# Patient Record
Sex: FEMALE | Race: WHITE | Employment: FULL TIME | URBAN - METROPOLITAN AREA
[De-identification: names, ages, dates, MRNs, and addresses within clinical notes are randomized per-mention and may not be internally consistent; named-entity substitution may affect disease eponyms.]

---

## 2020-05-26 ENCOUNTER — TELEPHONE (OUTPATIENT)
Dept: OTHER | Facility: CLINIC | Age: 25
End: 2020-05-26

## 2020-05-26 NOTE — TELEPHONE ENCOUNTER
Pt called, left vm requesting consult for vein on right calf.     I called pt back, verified  and reason for consult.     Pt needs new consult for [facility]. Routing to  RN for coordinating.     COBY DsouzaN, RN  [facility]

## 2020-05-26 NOTE — TELEPHONE ENCOUNTER
Pt can be reached at [phone].     Peggy Valle, COBYN, RN  [facility] [place]

## 2020-05-27 NOTE — TELEPHONE ENCOUNTER
Called pt and LDVM regarding scheduling a virtual visit at this time with our vein clinics still being closed d/t COVID-19. Dr. Jewell is available anything this week or next week.    Gave pt our main phone number to call back ([phone]) if interested in scheduling a video visit. Otherwise, informed pt we will add her to our reschedule list if we do not hear back from her. Then we will contact her when we are back open and scheduling.    COBY LariosN, RN  [facility]

## 2020-05-28 NOTE — TELEPHONE ENCOUNTER
Pt called back today and has been put on Dr. Jewell's schedule on Friday 5/29 for a virtual visit.     No further action needed at this time.    COBY LariosN, RN  [facility]  [facility]

## 2020-05-29 ENCOUNTER — VIRTUAL VISIT (OUTPATIENT)
Dept: VASCULAR SURGERY | Facility: CLINIC | Age: 25
End: 2020-05-29

## 2020-05-29 DIAGNOSIS — I83.811 VARICOSE VEINS OF RIGHT LOWER EXTREMITY WITH PAIN: Primary | ICD-10-CM

## 2020-05-29 DIAGNOSIS — I83.891 VARICOSE VEINS OF RIGHT LOWER EXTREMITY WITH EDEMA: ICD-10-CM

## 2020-05-29 PROCEDURE — 99207 ZZC VEINSOLUTIONS FREE SCREENING: CPT | Performed by: SURGERY

## 2020-05-29 RX ORDER — DEXTROAMPHETAMINE SACCHARATE, AMPHETAMINE ASPARTATE MONOHYDRATE, DEXTROAMPHETAMINE SULFATE AND AMPHETAMINE SULFATE 5; 5; 5; 5 MG/1; MG/1; MG/1; MG/1
20 CAPSULE, EXTENDED RELEASE ORAL DAILY
COMMUNITY
Start: 2020-03-20

## 2020-05-29 NOTE — LETTER
"    5/29/2020         RE: Telma Lieberman  [address] [facility] [place] [address]        Dear Colleague,    Thank you for referring your patient, Telma Lieberman, [facility] Please see a copy of my visit note below.    Telma Lieberman is a 24 year old female who is being evaluated via a billable video visit.      The patient has been notified of following:     \"This video visit will be conducted via a call between you and your physician/provider. We have found that certain health care needs can be provided without the need for an in-person physical exam.  This service lets us provide the care you need with a video conversation.  If a prescription is necessary we can send it directly to your pharmacy.  If lab work is needed we can place an order for that and you can then stop by our lab to have the test done at a later time.    Video visits are billed at different rates depending on your insurance coverage.  Please reach out to your insurance provider with any questions.    If during the course of the call the physician/provider feels a video visit is not appropriate, you will not be charged for this service.\"    Patient has given verbal consent for Video visit? Yes    How would you like to obtain your AVS? Mail a copy    Patient would like the video invitation sent by: Text to cell phone: [phone]    Will anyone else be joining your video visit? No    Video-Visit Details    Type of service:  Video Visit    Video Start Time: 10:22 AM  Video End Time: 10:55 AM    Originating Location (pt. Location): Home    Distant Location (provider location): [facility]     Platform used for Video Visit: [facility] free screening  Telma Lieberman is a 24-year-old female who presents with a several year history of veins of her right lower extremity but presents at this time because of the development of a new varicosity on the distal medial right leg.  " "She describes pain as an aching, tiredness and heaviness and admits to swelling of the right ankle frequently.  She also notes that her right calf is larger than her left chronically.  She has worn compression hose for 3 months and notes some improvement in the swelling but does not have significant pain relief with them.    She has a history of deep vein thrombosis, superficial thrombophlebitis or hemorrhage.  She has a heterozygous factor V Leiden mutation.    She does feel that the symptoms interfere with actives of daily living, making it difficult for her to be on her feet for long periods of time due to the aching.    Past medical history is significant for asthma.    Family history significant for varicose veins in her mother and in her maternal grandmother.  Both her mother and maternal grandmother have factor V Leiden mutation.  He states that her grandmother had a \"clot\" when she was pregnant.  She also had a blood clot near the time of her death.  Her mother has not had venous thromboembolism.    Physical exam  General: Pleasant female in no acute distress  HEENT: Normocephalic, atraumatic.  EOMI.  External ears and nose are normal.  Respiratory: Normal respiratory effort  Psychiatric: Judgment, insight, mood and affect are normal  Extremities: Right lower extremity: She has a 6 mm vein coursing down the anteromedial right leg along the course of the great saphenous vein.  This likely represents a dilated, immediate subcutaneous great saphenous vein.    There is a 4 to 5 mm varicosity distal third of the posterior leg coursing and difficulty with saphenous vein.  There are scattered telangiectasias about her right medial ankle with a 3 mm varicosity coursing on the medial ankle as well.    The right calf is visibly larger than the left.  There is trace edema of her right ankle but no venous stasis changes.    Impression  Venous clinical severity score 9, CEAP 3 bilateral severity chronic venous " insufficiency recalcitrant to conservative measures.  We discussed options of continued conservative management with compression, elevation, dietary measures and exercise.  She understands that this is a slowly progressive process.    Risks of conservative measures including superficial thrombophlebitis, bleeding and progression of the venous insufficiency were discussed.    She wishes to pursue a right lower extremity venous competency study to better diagnose the problem and to plan future treatment.    Plan  Right lower extremity venous competency study when we resume scheduling office patients.    MD KENNA Najera MD    Again, thank you for allowing me to participate in the care of your patient.        Sincerely,        Sloan Jewell MD

## 2020-05-29 NOTE — PROGRESS NOTES
"Telma Lieberman is a 24 year old female who is being evaluated via a billable video visit.      The patient has been notified of following:     \"This video visit will be conducted via a call between you and your physician/provider. We have found that certain health care needs can be provided without the need for an in-person physical exam.  This service lets us provide the care you need with a video conversation.  If a prescription is necessary we can send it directly to your pharmacy.  If lab work is needed we can place an order for that and you can then stop by our lab to have the test done at a later time.    Video visits are billed at different rates depending on your insurance coverage.  Please reach out to your insurance provider with any questions.    If during the course of the call the physician/provider feels a video visit is not appropriate, you will not be charged for this service.\"    Patient has given verbal consent for Video visit? Yes    How would you like to obtain your AVS? Mail a copy    Patient would like the video invitation sent by: Text to cell phone: [phone]    Will anyone else be joining your video visit? No    Video-Visit Details    Type of service:  Video Visit    Video Start Time: 10:22 AM  Video End Time: 10:55 AM    Originating Location (pt. Location): Home    Distant Location (provider location): [facility]     Platform used for Video Visit: [facility] free screening  Telma Lieberman is a 24-year-old female who presents with a several year history of veins of her right lower extremity but presents at this time because of the development of a new varicosity on the distal medial right leg.  She describes pain as an aching, tiredness and heaviness and admits to swelling of the right ankle frequently.  She also notes that her right calf is larger than her left chronically.  She has worn compression hose for 3 months and notes some improvement in " "the swelling but does not have significant pain relief with them.    She has a history of deep vein thrombosis, superficial thrombophlebitis or hemorrhage.  She has a heterozygous factor V Leiden mutation.    She does feel that the symptoms interfere with actives of daily living, making it difficult for her to be on her feet for long periods of time due to the aching.    Past medical history is significant for asthma.    Family history significant for varicose veins in her mother and in her maternal grandmother.  Both her mother and maternal grandmother have factor V Leiden mutation.  He states that her grandmother had a \"clot\" when she was pregnant.  She also had a blood clot near the time of her death.  Her mother has not had venous thromboembolism.    Physical exam  General: Pleasant female in no acute distress  HEENT: Normocephalic, atraumatic.  EOMI.  External ears and nose are normal.  Respiratory: Normal respiratory effort  Psychiatric: Judgment, insight, mood and affect are normal  Extremities: Right lower extremity: She has a 6 mm vein coursing down the anteromedial right leg along the course of the great saphenous vein.  This likely represents a dilated, immediate subcutaneous great saphenous vein.    There is a 4 to 5 mm varicosity distal third of the posterior leg coursing and difficulty with saphenous vein.  There are scattered telangiectasias about her right medial ankle with a 3 mm varicosity coursing on the medial ankle as well.    The right calf is visibly larger than the left.  There is trace edema of her right ankle but no venous stasis changes.    Impression  Venous clinical severity score 9, CEAP 3 bilateral severity chronic venous insufficiency recalcitrant to conservative measures.  We discussed options of continued conservative management with compression, elevation, dietary measures and exercise.  She understands that this is a slowly progressive process.    Risks of conservative measures " including superficial thrombophlebitis, bleeding and progression of the venous insufficiency were discussed.    She wishes to pursue a right lower extremity venous competency study to better diagnose the problem and to plan future treatment.    Plan  Right lower extremity venous competency study when we resume scheduling office patients.    MD KENNA Najera MD

## 2020-05-29 NOTE — Clinical Note
Schedule patient for right lower extremity venous competency study when we resume scheduling ultrasounds

## 2020-06-22 ENCOUNTER — ANCILLARY PROCEDURE (OUTPATIENT)
Dept: ULTRASOUND IMAGING | Facility: CLINIC | Age: 25
End: 2020-06-22
Attending: SURGERY
Payer: COMMERCIAL

## 2020-06-22 ENCOUNTER — OFFICE VISIT (OUTPATIENT)
Dept: VASCULAR SURGERY | Facility: CLINIC | Age: 25
End: 2020-06-22
Attending: SURGERY
Payer: COMMERCIAL

## 2020-06-22 DIAGNOSIS — I83.811 VARICOSE VEINS OF RIGHT LOWER EXTREMITY WITH PAIN: Primary | ICD-10-CM

## 2020-06-22 DIAGNOSIS — I83.811 VARICOSE VEINS OF RIGHT LOWER EXTREMITY WITH PAIN: ICD-10-CM

## 2020-06-22 DIAGNOSIS — I83.891 VARICOSE VEINS OF LEG WITH SWELLING, RIGHT: ICD-10-CM

## 2020-06-22 DIAGNOSIS — I83.891 VARICOSE VEINS OF RIGHT LOWER EXTREMITY WITH EDEMA: ICD-10-CM

## 2020-06-22 PROCEDURE — 93971 EXTREMITY STUDY: CPT | Mod: RT | Performed by: SURGERY

## 2020-06-22 PROCEDURE — 99213 OFFICE O/P EST LOW 20 MIN: CPT | Performed by: SURGERY

## 2020-06-22 NOTE — Clinical Note
Estimates for cyanoacrylate glue ablation (VenaSeal) of the right great saphenous vein.  If approval cannot be obtained then radiofrequency ablation will be performed.  She may need to sessions of ultrasound-guided, medically necessary sclerotherapy at a later date if her tributaries do not resolve.

## 2020-06-22 NOTE — LETTER
6/22/2020         RE: Telma Lieberman  [address] [place] [address]        Dear Colleague,    Thank you for referring your patient, Telma Lieberman, [facility] Please see a copy of my visit note below.    [facility]    Telma Lieberman presents in follow-up of right lower extremity pain, swelling and varicose veins.  Please see my note from 5/29/2020 for details.  She returns today for right lower extremity venous duplex ultrasound.    She admits to having her toes greater than her fingers get dusky and cold weather.  She states that the the fourth and fifth fingers of her hands have sometimes turned white when exposed to the cold.  She admitted that swimming in a cold leg recently caused her toes to become quite dusky.    Her past medical history is significant for heterozygous factor V Leiden mutation without thrombotic complications.  In my previous dictation I erroneously stated that she had had thromboembolic complications when she has not.  No history of superficial thrombophlebitis or deep vein thrombosis.  Her mother also has heterozygous factor V Leiden mutation and has had no complications.    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  Both her mother and her maternal grandmother had varicose veins.  Her grandmother had superficial thrombophlebitis with a pregnancy.    Physical Exam  General: Pleasant female in no acute distress.  Blood pressure 114/71, pulse 85 and regular  Extremities: Right lower extremity: 6 mm vein coursing down the anteromedial right leg with slightly dilated areas in the proximal third of the leg and another area of the distal right anteromedial leg.  There is a varicosity coursing posterior medially and distally measuring 4 to 5 mm.  The right great saphenous  vein is enlarged extending onto the dorsum of the right foot.  There is 1+ edema of the right ankle.  There is some duskiness of the toes the right foot.  She has several small eschars from insect bites from being outside.    Left lower extremity: No significant varicose veins are appreciated.  She has no significant edema of the left ankle.  There is some duskiness of the toes of her left foot.    She has 3+ radial pulses bilaterally.  Trace duskiness noted in the tips of her fingers.    She has 3+ dorsalis pedis and posterior tibial pulses bilaterally.    Ultrasound:  Right lower extremity: No evidence of deep vein thrombosis.  Her right common femoral through mid femoral veins are incompetent.  The distal femoral and popliteal veins are competent.    Her right great saphenous vein is incompetent at the saphenofemoral junction to the ankle, measures 10.7 mm in diameter with reflux time of 3.9 seconds.  It is a source of a 4.1 mm diameter incompetent vein branch coursing down the medial calf from the calf great saphenous vein with reflux time of 3 seconds.    The right small saphenous vein, vein of Giacomini and anterior chest were saphenous veins are competent.    There is a 3.6 mm diameter incompetent  13 cm below the knee crease communicating with the great saphenous vein.  There is a 3 mm diameter incompetent  5 cm above the medial malleolus on the medial leg.    Assessment:  CEAP 3 right lower extremity chronic venous insufficiency secondary to right great saphenous vein incompetence.  She has worn compression hose for more than 3 months without improvement in her symptoms.  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  The aching and the swelling have not been controlled with compression and exercise.    If she chose  treatment, should be candidate for endovenous ablation of the right great saphenous vein.  Both thermal and nonthermal techniques were discussed.  Details of radiofrequency ablation including risks of bleeding, infection, nerve injury, scarring, hyperpigmentation, deep vein thrombosis, recanalization of the great saphenous vein and recurrent varicose veins were discussed.    Details of cyanoacrylate glue ablation of the great saphenous vein including risks of deep vein thrombosis, superficial thrombophlebitis, glue hypersensitivity and recanalization of the great saphenous vein were discussed.  She understands that an additional procedure may be required to completely eliminate the great saphenous vein tributaries.  This likely could be accomplished with ultrasound-guided, medically necessary sclerotherapy at a later date.    She understands the option of continued conservative measures with risks of superficial thrombophlebitis, bleeding and progression of the disease process.  She voiced understanding and her questions were answered.    Plan:  We will submit for insurance approval for cyanoacrylate glue ablation of the great saphenous vein.  If this will not be covered, we will perform radiofrequency ablation.  She will be traveling back to the [place] and [place] and would hope to have this procedure taking care of at that time.    Sloan Jewell MD    Dictation completed using dragon voice recognition software            Again, thank you for allowing me to participate in the care of your patient.        Sincerely,        Sloan Jewell MD

## 2020-06-22 NOTE — PROGRESS NOTES
[facility] Note    Telma Lieberman presents in follow-up of right lower extremity pain, swelling and varicose veins.  Please see my note from 5/29/2020 for details.  She returns today for right lower extremity venous duplex ultrasound.    She admits to having her toes greater than her fingers get dusky and cold weather.  She states that the the fourth and fifth fingers of her hands have sometimes turned white when exposed to the cold.  She admitted that swimming in a cold leg recently caused her toes to become quite dusky.    Her past medical history is significant for heterozygous factor V Leiden mutation without thrombotic complications.  In my previous dictation I erroneously stated that she had had thromboembolic complications when she has not.  No history of superficial thrombophlebitis or deep vein thrombosis.  Her mother also has heterozygous factor V Leiden mutation and has had no complications.    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  Both her mother and her maternal grandmother had varicose veins.  Her grandmother had superficial thrombophlebitis with a pregnancy.    Physical Exam  General: Pleasant female in no acute distress.  Blood pressure 114/71, pulse 85 and regular  Extremities: Right lower extremity: 6 mm vein coursing down the anteromedial right leg with slightly dilated areas in the proximal third of the leg and another area of the distal right anteromedial leg.  There is a varicosity coursing posterior medially and distally measuring 4 to 5 mm.  The right great saphenous vein is enlarged extending onto the dorsum of the right foot.  There is 1+ edema of the right ankle.  There is some duskiness of the toes the right foot.  She has several small eschars from insect bites from being outside.    Left lower extremity: No  significant varicose veins are appreciated.  She has no significant edema of the left ankle.  There is some duskiness of the toes of her left foot.    She has 3+ radial pulses bilaterally.  Trace duskiness noted in the tips of her fingers.    She has 3+ dorsalis pedis and posterior tibial pulses bilaterally.    Ultrasound:  Right lower extremity: No evidence of deep vein thrombosis.  Her right common femoral through mid femoral veins are incompetent.  The distal femoral and popliteal veins are competent.    Her right great saphenous vein is incompetent at the saphenofemoral junction to the ankle, measures 10.7 mm in diameter with reflux time of 3.9 seconds.  It is a source of a 4.1 mm diameter incompetent vein branch coursing down the medial calf from the calf great saphenous vein with reflux time of 3 seconds.    The right small saphenous vein, vein of Giacomini and anterior chest were saphenous veins are competent.    There is a 3.6 mm diameter incompetent  13 cm below the knee crease communicating with the great saphenous vein.  There is a 3 mm diameter incompetent  5 cm above the medial malleolus on the medial leg.    Assessment:  CEAP 3 right lower extremity chronic venous insufficiency secondary to right great saphenous vein incompetence.  She has worn compression hose for more than 3 months without improvement in her symptoms.  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  The aching and the swelling have not been controlled with compression and exercise.    If she chose treatment, should be candidate for endovenous ablation of the right great saphenous vein.  Both thermal and nonthermal techniques were discussed.  Details of radiofrequency ablation including risks of bleeding, infection, nerve injury, scarring,  hyperpigmentation, deep vein thrombosis, recanalization of the great saphenous vein and recurrent varicose veins were discussed.    Details of cyanoacrylate glue ablation of the great saphenous vein including risks of deep vein thrombosis, superficial thrombophlebitis, glue hypersensitivity and recanalization of the great saphenous vein were discussed.  She understands that an additional procedure may be required to completely eliminate the great saphenous vein tributaries.  This likely could be accomplished with ultrasound-guided, medically necessary sclerotherapy at a later date.    She understands the option of continued conservative measures with risks of superficial thrombophlebitis, bleeding and progression of the disease process.  She voiced understanding and her questions were answered.    Plan:  We will submit for insurance approval for cyanoacrylate glue ablation of the great saphenous vein.  If this will not be covered, we will perform radiofrequency ablation.  She will be traveling back to the [place] and [place] and would hope to have this procedure taking care of at that time.    Sloan Jewell MD    Dictation completed using dragon voice recognition software

## 2021-01-15 ENCOUNTER — HEALTH MAINTENANCE LETTER (OUTPATIENT)
Age: 26
End: 2021-01-15

## 2021-06-08 ENCOUNTER — TELEPHONE (OUTPATIENT)
Dept: VASCULAR SURGERY | Facility: CLINIC | Age: 26
End: 2021-06-08

## 2021-06-08 DIAGNOSIS — I83.891 VARICOSE VEINS OF LEG WITH SWELLING, RIGHT: ICD-10-CM

## 2021-06-08 DIAGNOSIS — I83.811 VARICOSE VEINS OF RIGHT LOWER EXTREMITY WITH PAIN: Primary | ICD-10-CM

## 2021-06-08 NOTE — TELEPHONE ENCOUNTER
Telma has moved to [place] and saw Dr. Jewell in 2020.  Patient would like to schedule procedure now, but authorization has  and patient would need recent ultrasound to submit for new PA.  Patient wants new ultrasound done in [place].  Explained that Dr. Jewell does not have privileges to order ultrasounds in [place].  Left patient my direct line to call back and discuss further if she decides to return to [place] to have imaging done.

## 2021-09-04 ENCOUNTER — HEALTH MAINTENANCE LETTER (OUTPATIENT)
Age: 26
End: 2021-09-04

## 2021-09-15 ENCOUNTER — ANCILLARY PROCEDURE (OUTPATIENT)
Dept: ULTRASOUND IMAGING | Facility: CLINIC | Age: 26
End: 2021-09-15
Attending: SURGERY
Payer: COMMERCIAL

## 2021-09-15 ENCOUNTER — OFFICE VISIT (OUTPATIENT)
Dept: VASCULAR SURGERY | Facility: CLINIC | Age: 26
End: 2021-09-15
Payer: COMMERCIAL

## 2021-09-15 DIAGNOSIS — I83.891 VARICOSE VEINS OF LEG WITH SWELLING, RIGHT: ICD-10-CM

## 2021-09-15 DIAGNOSIS — I83.811 VARICOSE VEINS OF RIGHT LOWER EXTREMITY WITH PAIN: Primary | ICD-10-CM

## 2021-09-15 DIAGNOSIS — I83.811 VARICOSE VEINS OF RIGHT LOWER EXTREMITY WITH PAIN: ICD-10-CM

## 2021-09-15 PROCEDURE — 99203 OFFICE O/P NEW LOW 30 MIN: CPT | Performed by: SURGERY

## 2021-09-15 PROCEDURE — 93971 EXTREMITY STUDY: CPT | Mod: RT | Performed by: SURGERY

## 2021-09-15 NOTE — PROGRESS NOTES
[facility] CONSULTATION    HPI:    Telma Lieberman is a pleasant self-referred 26 year old female whom I have seen in the past regarding right greater than left lower extremity varicose veins with pain and swelling.  I last saw her in 2018 at which time we performed an ultrasound that revealed a dilated, incompetent right great saphenous vein with tributaries coursing from it below the knee.  She describes aching, tiredness, heaviness and swelling of her leg on a regular basis despite wearing compression hose on a daily basis.  She exercises to control her weight and elevate her leg when she can but has not been able to control the symptoms adequately.  She has worn compression for 1-1/2 years.    In addition to the pain she describes as aching, tiredness and heaviness and cramping, she gets excessive fatigue of right leg.  The pain is worse after she has been on her feet for long peers of time in a warm weather and improves with elevation of leg and wearing compression.  The patient definitely feels that the symptoms interfere with actives of daily living because it makes it difficult for her to be on her feet for extended periods of time given the amount of standing she has to do.    She has no history of deep vein thrombosis or superficial thrombophlebitis but does have known factor V Leiden mutation.  There are some family members who have had venous thromboembolism.    Family history is significant for varicose veins in her father and telangiectasias and her mother.    PAST MEDICAL HISTORY: No past medical history on file.    PAST SURGICAL HISTORY: No past surgical history on file.    FAMILY HISTORY: No family history on file.    SOCIAL HISTORY:   Social History     Tobacco Use     Smoking status: Not on file   Substance Use Topics     Alcohol use: Not on file       REVIEW OF SYSTEMS: Review Of Systems  Skin: negative  Eyes: negative  Ears/Nose/Throat: negative  Respiratory: No shortness of breath, dyspnea  on exertion, cough, or hemoptysis  Cardiovascular: negative  Gastrointestinal: negative  Genitourinary: negative  Musculoskeletal: Leg pain, leg swelling  Neurologic: negative  Psychiatric: negative  Hematologic/Lymphatic/Immunologic: negative  Endocrine: negative      Vital signs:  There were no vitals taken for this visit.    Current Outpatient Medications   Medication Sig Dispense Refill     amphetamine-dextroamphetamine (ADDERALL XR) 20 MG 24 hr capsule Take 20 mg by mouth daily         PHYSICAL EXAM:  General: Pleasant, NAD.   HEENT: Normocephalic, atraumatic, external ears and nose normal.   Respiratory: Normal respiratory effort.   Cardiovascular: Pulse is regular.   Musculoskeletal: Gait and station normal.  The joints of her fingers and toes without deformity.  There is no cyanosis of her nailbeds.   EXTREMITIES: Right lower extremity: 5 mm varicosities segmentally over the right proximal medial calf and over the distal third of the medial calf along the course of the great saphenous vein.  There are 4 to 5 mm varicosities coursing distally and posterior all medially from the great saphenous vein, medial calf.  Some duskiness of her toes is noted with some coolness consistent with Raynaud's symptoms.  No wounds.  Trace to 1+ edema of the right ankle and foot.  She has a knee-high compression stocking on the left leg.  No edema is noted.   PULSES: R/L (3=normal pulse, 0=no palpable pulse) dorsalis pedis: 3/3; posterior tibial: 3/3.      Neurologic: Grossly normal  Psychiatric: Mood, affect, judgment and insight are normal     Venous Duplex Ultrasound:   Right lower extremity: Common femoral through distal femoral veins are incompetent.  Her popliteal vein is competent.  The great saphenous vein is incompetent from the saphenofemoral junction to the ankle, measures 11 mm in diameter distally with reflux time of 3.3 seconds.  Is the source of multiple incompetent vein branches measuring up to 4.8 mm in  diameter with reflux time of 1.4 seconds.  Small saphenous vein, Vein of Giacomini are competent.  There are multiple incompetent perforators on the medial leg, the largest measuring 4.8 mm in diameter 10 cm below the knee crease communicating with the great saphenous vein and an incompetent vein branch.  A 3.6 mm diameter incompetent  communicates with the great saphenous vein at the mid thigh.    The left common femoral vein demonstrates normal phasicity, compression and augmentation with no evidence of deep vein thrombosis.  The left common femoral vein is incompetent.    ASSESSMENT:  Once again discussed the anatomy and pathophysiology of venous insufficiency.  She has pursued conservative measures for more than a year and a half only to have better saphenous vein enlarged and become increasingly symptomatic.  We did discuss the option of continued conservative measures with risk of superficial thrombophlebitis, bleeding and progression of disease process.    She is a candidate for endovenous ablation of the right great saphenous vein with either thermal and nonthermal techniques.  A nonthermal technique would be better for her given the fact that her right saphenous vein will need to be ablated small and down in her leg has large varicosities coursing from the great saphenous vein in the distal third of the leg.  A better job of the pressurizing the great saphenous vein tributaries and avoiding saphenous nerve injury.  Additionally, the patient works in [place] and will fly to [place] to have the treatment but will need to return as quickly as possible.  A nonthermal technique will allow her travel more quickly with low risk of venous thromboembolism.    Risks of cyanoacrylate glue ablation including deep vein thrombosis and glue hypersensitivity were received discussed.  She knows glue hypersensitivity may have to be treated with steroids if it occurs.    PLAN:  Endovenous ablation right  great saphenous vein, preferably vein nonthermal techniques such as cyanoacrylate glue ablation to allow treatment of the entire length of the great saphenous vein and will prompt travel back to her place of employment in [facility].     Sloan Jewell MD    Dictated using Dragon voice recognition software which may result in transcription errors          [facility] NEW PATIENT:

## 2021-09-15 NOTE — LETTER
9/15/2021         RE: Telma Lieberman  [address] [place] [address]        Dear Colleague,    Thank you for referring your patient, Telma Lieberman, to the [facility] [place]. Please see a copy of my visit note below.    [facility] CONSULTATION    HPI:    Telma Lieberman is a pleasant self-referred 26 year old female whom I have seen in the past regarding right greater than left lower extremity varicose veins with pain and swelling.  I last saw her in 2018 at which time we performed an ultrasound that revealed a dilated, incompetent right great saphenous vein with tributaries coursing from it below the knee.  She describes aching, tiredness, heaviness and swelling of her leg on a regular basis despite wearing compression hose on a daily basis.  She exercises to control her weight and elevate her leg when she can but has not been able to control the symptoms adequately.  She has worn compression for 1-1/2 years.    In addition to the pain she describes as aching, tiredness and heaviness and cramping, she gets excessive fatigue of right leg.  The pain is worse after she has been on her feet for long peers of time in a warm weather and improves with elevation of leg and wearing compression.  The patient definitely feels that the symptoms interfere with actives of daily living because it makes it difficult for her to be on her feet for extended periods of time given the amount of standing she has to do.    She has no history of deep vein thrombosis or superficial thrombophlebitis but does have known factor V Leiden mutation.  There are some family members who have had venous thromboembolism.    Family history is significant for varicose veins in her father and telangiectasias and her mother.    PAST MEDICAL HISTORY: No past medical history on file.    PAST SURGICAL HISTORY: No past surgical history on file.    FAMILY HISTORY: No family history on file.    SOCIAL HISTORY:   Social  History     Tobacco Use     Smoking status: Not on file   Substance Use Topics     Alcohol use: Not on file       REVIEW OF SYSTEMS: Review Of Systems  Skin: negative  Eyes: negative  Ears/Nose/Throat: negative  Respiratory: No shortness of breath, dyspnea on exertion, cough, or hemoptysis  Cardiovascular: negative  Gastrointestinal: negative  Genitourinary: negative  Musculoskeletal: Leg pain, leg swelling  Neurologic: negative  Psychiatric: negative  Hematologic/Lymphatic/Immunologic: negative  Endocrine: negative      Vital signs:  There were no vitals taken for this visit.    Current Outpatient Medications   Medication Sig Dispense Refill     amphetamine-dextroamphetamine (ADDERALL XR) 20 MG 24 hr capsule Take 20 mg by mouth daily         PHYSICAL EXAM:  General: Pleasant, NAD.   HEENT: Normocephalic, atraumatic, external ears and nose normal.   Respiratory: Normal respiratory effort.   Cardiovascular: Pulse is regular.   Musculoskeletal: Gait and station normal.  The joints of her fingers and toes without deformity.  There is no cyanosis of her nailbeds.   EXTREMITIES: Right lower extremity: 5 mm varicosities segmentally over the right proximal medial calf and over the distal third of the medial calf along the course of the great saphenous vein.  There are 4 to 5 mm varicosities coursing distally and posterior all medially from the great saphenous vein, medial calf.  Some duskiness of her toes is noted with some coolness consistent with Raynaud's symptoms.  No wounds.  Trace to 1+ edema of the right ankle and foot.  She has a knee-high compression stocking on the left leg.  No edema is noted.   PULSES: R/L (3=normal pulse, 0=no palpable pulse) dorsalis pedis: 3/3; posterior tibial: 3/3.      Neurologic: Grossly normal  Psychiatric: Mood, affect, judgment and insight are normal     Venous Duplex Ultrasound:   Right lower extremity: Common femoral through distal femoral veins are incompetent.  Her popliteal vein  is competent.  The great saphenous vein is incompetent from the saphenofemoral junction to the ankle, measures 11 mm in diameter distally with reflux time of 3.3 seconds.  Is the source of multiple incompetent vein branches measuring up to 4.8 mm in diameter with reflux time of 1.4 seconds.  Small saphenous vein, Vein of Giacomini are competent.  There are multiple incompetent perforators on the medial leg, the largest measuring 4.8 mm in diameter 10 cm below the knee crease communicating with the great saphenous vein and an incompetent vein branch.  A 3.6 mm diameter incompetent  communicates with the great saphenous vein at the mid thigh.    The left common femoral vein demonstrates normal phasicity, compression and augmentation with no evidence of deep vein thrombosis.  The left common femoral vein is incompetent.    ASSESSMENT:  Once again discussed the anatomy and pathophysiology of venous insufficiency.  She has pursued conservative measures for more than a year and a half only to have better saphenous vein enlarged and become increasingly symptomatic.  We did discuss the option of continued conservative measures with risk of superficial thrombophlebitis, bleeding and progression of disease process.    She is a candidate for endovenous ablation of the right great saphenous vein with either thermal and nonthermal techniques.  A nonthermal technique would be better for her given the fact that her right saphenous vein will need to be ablated small and down in her leg has large varicosities coursing from the great saphenous vein in the distal third of the leg.  A better job of the pressurizing the great saphenous vein tributaries and avoiding saphenous nerve injury.  Additionally, the patient works in [place] and will fly to [place] to have the treatment but will need to return as quickly as possible.  A nonthermal technique will allow her travel more quickly with low risk of venous  thromboembolism.    Risks of cyanoacrylate glue ablation including deep vein thrombosis and glue hypersensitivity were received discussed.  She knows glue hypersensitivity may have to be treated with steroids if it occurs.    PLAN:  Endovenous ablation right great saphenous vein, preferably vein nonthermal techniques such as cyanoacrylate glue ablation to allow treatment of the entire length of the great saphenous vein and will prompt travel back to her place of employment in [facility].     Sloan Jewell MD    Dictated using Dragon voice recognition software which may result in transcription errors          [facility] NEW PATIENT:                  Again, thank you for allowing me to participate in the care of your patient.        Sincerely,        Sloan Jewell MD

## 2022-02-16 ENCOUNTER — TELEPHONE (OUTPATIENT)
Dept: VASCULAR SURGERY | Facility: CLINIC | Age: 27
End: 2022-02-16
Payer: COMMERCIAL

## 2022-02-16 NOTE — TELEPHONE ENCOUNTER
Spoke with patient regarding the different procedures. Pt would like the procedure information emailed to her, so she can review.     Pt will call back and schedule an updated ultrasound and return visit with Dr. Jewell.    Roxie Tolentino, Surgery Scheduler  [facility]

## 2022-02-19 ENCOUNTER — HEALTH MAINTENANCE LETTER (OUTPATIENT)
Age: 27
End: 2022-02-19

## 2022-10-16 ENCOUNTER — HEALTH MAINTENANCE LETTER (OUTPATIENT)
Age: 27
End: 2022-10-16

## 2023-04-01 ENCOUNTER — HEALTH MAINTENANCE LETTER (OUTPATIENT)
Age: 28
End: 2023-04-01

## 2024-06-01 ENCOUNTER — HEALTH MAINTENANCE LETTER (OUTPATIENT)
Age: 29
End: 2024-06-01